# Patient Record
Sex: FEMALE | Race: BLACK OR AFRICAN AMERICAN | URBAN - METROPOLITAN AREA
[De-identification: names, ages, dates, MRNs, and addresses within clinical notes are randomized per-mention and may not be internally consistent; named-entity substitution may affect disease eponyms.]

---

## 2018-02-23 ENCOUNTER — INPATIENT (INPATIENT)
Facility: HOSPITAL | Age: 68
LOS: 4 days | Discharge: NURSING, REHAB OR LONG-TERM CARE | End: 2018-02-28
Attending: INTERNAL MEDICINE | Admitting: INTERNAL MEDICINE
Payer: MEDICARE

## 2018-02-23 VITALS — HEIGHT: 63 IN | WEIGHT: 100.09 LBS

## 2018-02-23 DIAGNOSIS — R69 ILLNESS, UNSPECIFIED: ICD-10-CM

## 2018-02-23 DIAGNOSIS — F03.90 UNSPECIFIED DEMENTIA WITHOUT BEHAVIORAL DISTURBANCE: ICD-10-CM

## 2018-02-23 LAB
ALBUMIN SERPL ELPH-MCNC: 3.5 G/DL — SIGNIFICANT CHANGE UP (ref 3.3–5)
ALP SERPL-CCNC: 111 U/L — SIGNIFICANT CHANGE UP (ref 40–120)
ALT FLD-CCNC: 25 U/L — SIGNIFICANT CHANGE UP (ref 12–78)
AMMONIA BLD-MCNC: <10 UMOL/L — LOW (ref 11–32)
AMPHET UR-MCNC: NEGATIVE — SIGNIFICANT CHANGE UP
ANION GAP SERPL CALC-SCNC: 4 MMOL/L — LOW (ref 5–17)
APAP SERPL-MCNC: <2 UG/ML — LOW (ref 10–30)
APPEARANCE UR: CLEAR — SIGNIFICANT CHANGE UP
AST SERPL-CCNC: 24 U/L — SIGNIFICANT CHANGE UP (ref 15–37)
BACTERIA # UR AUTO: (no result)
BARBITURATES UR SCN-MCNC: NEGATIVE — SIGNIFICANT CHANGE UP
BASOPHILS # BLD AUTO: 0.1 K/UL — SIGNIFICANT CHANGE UP (ref 0–0.2)
BASOPHILS NFR BLD AUTO: 1.4 % — SIGNIFICANT CHANGE UP (ref 0–2)
BENZODIAZ UR-MCNC: NEGATIVE — SIGNIFICANT CHANGE UP
BILIRUB SERPL-MCNC: 0.8 MG/DL — SIGNIFICANT CHANGE UP (ref 0.2–1.2)
BILIRUB UR-MCNC: NEGATIVE — SIGNIFICANT CHANGE UP
BUN SERPL-MCNC: 11 MG/DL — SIGNIFICANT CHANGE UP (ref 7–23)
CALCIUM SERPL-MCNC: 9.2 MG/DL — SIGNIFICANT CHANGE UP (ref 8.5–10.1)
CHLORIDE SERPL-SCNC: 100 MMOL/L — SIGNIFICANT CHANGE UP (ref 96–108)
CO2 SERPL-SCNC: 34 MMOL/L — HIGH (ref 22–31)
COCAINE METAB.OTHER UR-MCNC: NEGATIVE — SIGNIFICANT CHANGE UP
COLOR SPEC: YELLOW — SIGNIFICANT CHANGE UP
CREAT SERPL-MCNC: 0.76 MG/DL — SIGNIFICANT CHANGE UP (ref 0.5–1.3)
DIFF PNL FLD: NEGATIVE — SIGNIFICANT CHANGE UP
EOSINOPHIL # BLD AUTO: 0.2 K/UL — SIGNIFICANT CHANGE UP (ref 0–0.5)
EOSINOPHIL NFR BLD AUTO: 3 % — SIGNIFICANT CHANGE UP (ref 0–6)
EPI CELLS # UR: SIGNIFICANT CHANGE UP
ETHANOL SERPL-MCNC: <10 MG/DL — SIGNIFICANT CHANGE UP (ref 0–10)
GLUCOSE SERPL-MCNC: 85 MG/DL — SIGNIFICANT CHANGE UP (ref 70–99)
GLUCOSE UR QL: NEGATIVE MG/DL — SIGNIFICANT CHANGE UP
HCT VFR BLD CALC: 41.3 % — SIGNIFICANT CHANGE UP (ref 34.5–45)
HGB BLD-MCNC: 13.3 G/DL — SIGNIFICANT CHANGE UP (ref 11.5–15.5)
KETONES UR-MCNC: NEGATIVE — SIGNIFICANT CHANGE UP
LEUKOCYTE ESTERASE UR-ACNC: (no result)
LYMPHOCYTES # BLD AUTO: 2.1 K/UL — SIGNIFICANT CHANGE UP (ref 1–3.3)
LYMPHOCYTES # BLD AUTO: 40.6 % — SIGNIFICANT CHANGE UP (ref 13–44)
MCHC RBC-ENTMCNC: 29 PG — SIGNIFICANT CHANGE UP (ref 27–34)
MCHC RBC-ENTMCNC: 32.2 GM/DL — SIGNIFICANT CHANGE UP (ref 32–36)
MCV RBC AUTO: 89.8 FL — SIGNIFICANT CHANGE UP (ref 80–100)
METHADONE UR-MCNC: NEGATIVE — SIGNIFICANT CHANGE UP
MONOCYTES # BLD AUTO: 0.5 K/UL — SIGNIFICANT CHANGE UP (ref 0–0.9)
MONOCYTES NFR BLD AUTO: 10.6 % — SIGNIFICANT CHANGE UP (ref 2–14)
NEUTROPHILS # BLD AUTO: 2.3 K/UL — SIGNIFICANT CHANGE UP (ref 1.8–7.4)
NEUTROPHILS NFR BLD AUTO: 44.4 % — SIGNIFICANT CHANGE UP (ref 43–77)
NITRITE UR-MCNC: NEGATIVE — SIGNIFICANT CHANGE UP
OPIATES UR-MCNC: NEGATIVE — SIGNIFICANT CHANGE UP
PCP SPEC-MCNC: SIGNIFICANT CHANGE UP
PCP UR-MCNC: NEGATIVE — SIGNIFICANT CHANGE UP
PH UR: 8 — SIGNIFICANT CHANGE UP (ref 5–8)
PLATELET # BLD AUTO: 195 K/UL — SIGNIFICANT CHANGE UP (ref 150–400)
POTASSIUM SERPL-MCNC: 3.1 MMOL/L — LOW (ref 3.5–5.3)
POTASSIUM SERPL-SCNC: 3.1 MMOL/L — LOW (ref 3.5–5.3)
PROT SERPL-MCNC: 7.7 GM/DL — SIGNIFICANT CHANGE UP (ref 6–8.3)
PROT UR-MCNC: NEGATIVE MG/DL — SIGNIFICANT CHANGE UP
RBC # BLD: 4.59 M/UL — SIGNIFICANT CHANGE UP (ref 3.8–5.2)
RBC # FLD: 12.3 % — SIGNIFICANT CHANGE UP (ref 10.3–14.5)
RBC CASTS # UR COMP ASSIST: SIGNIFICANT CHANGE UP /HPF (ref 0–4)
SALICYLATES SERPL-MCNC: <1.7 MG/DL — LOW (ref 2.8–20)
SODIUM SERPL-SCNC: 138 MMOL/L — SIGNIFICANT CHANGE UP (ref 135–145)
SP GR SPEC: 1.01 — SIGNIFICANT CHANGE UP (ref 1.01–1.02)
THC UR QL: NEGATIVE — SIGNIFICANT CHANGE UP
TSH SERPL-MCNC: 1.03 UU/ML — SIGNIFICANT CHANGE UP (ref 0.36–3.74)
UROBILINOGEN FLD QL: NEGATIVE MG/DL — SIGNIFICANT CHANGE UP
WBC # BLD: 5.1 K/UL — SIGNIFICANT CHANGE UP (ref 3.8–10.5)
WBC # FLD AUTO: 5.1 K/UL — SIGNIFICANT CHANGE UP (ref 3.8–10.5)
WBC UR QL: SIGNIFICANT CHANGE UP

## 2018-02-23 PROCEDURE — 99285 EMERGENCY DEPT VISIT HI MDM: CPT

## 2018-02-23 PROCEDURE — 70450 CT HEAD/BRAIN W/O DYE: CPT | Mod: 26

## 2018-02-23 PROCEDURE — 93010 ELECTROCARDIOGRAM REPORT: CPT

## 2018-02-23 RX ORDER — POTASSIUM CHLORIDE 20 MEQ
40 PACKET (EA) ORAL ONCE
Qty: 0 | Refills: 0 | Status: COMPLETED | OUTPATIENT
Start: 2018-02-23 | End: 2018-02-23

## 2018-02-23 RX ADMIN — Medication 1 MILLIGRAM(S): at 21:58

## 2018-02-23 NOTE — ED ADULT NURSE NOTE - ED STAT RN HANDOFF DETAILS 2
Received report from RN Lizzette and reassessed patient. Agree with the previous RN assessment. Patient has a 1:1 CO in place and is to be admitted as a social admission. No distress or complaints, will continue to monitor/reassess.

## 2018-02-23 NOTE — ED BEHAVIORAL HEALTH ASSESSMENT NOTE - REFERRAL / APPOINTMENT DETAILS
Pt is cleared psychiatrically for discharge as presents with no acute psych needs.  Recommend neuro eval and placement

## 2018-02-23 NOTE — ED ADULT NURSE NOTE - OBJECTIVE STATEMENT
Patient presents with son at bedside. Son states he was called by her neighbors because patient was screaming, fingers were bleeding and patient was hoarding. Patient does not know the date or President. Son at bedside states decreased PO intake - patient requested a sandwich upon arrival to ER. Patient has flight of ideas, states she will dress like us and escape. States she will be a good patient if she can be discharged. Patient calm and cooperative. Son states patient has history of HTN and depression. States she does not take her medications

## 2018-02-23 NOTE — ED STATDOCS - PROGRESS NOTE DETAILS
Ambrose Brennan: 66 y/o female with pmhx of HTN, mitral valve prolapse and depression presents to ED c/o malnutrition. Son states pt is not eating properly, that she is only eating candy and nuts. Son states pt is a hoarder and hasn't seen a doctor over a year. Son states he has received calls from neighbors saying pt's fingers are bleeding and that she is screaming. Upon arrival, son found pt talking to herself. Pt states nothing is wrong with her, that she lives alone and has a right to brea things in her own house. Son states pt has been prescribed medication which she does not take. Pt has no other complaints and denies SOB, CP, fever/chills, n/v/d and HA. Pt is not able to recall the President, the month or the year--mentions she doesn't pay much attention to these things and can recall them if she needs to. Will be sent to Main ED for eval.

## 2018-02-23 NOTE — ED BEHAVIORAL HEALTH ASSESSMENT NOTE - SUMMARY
67 yodAAf, lives alone retired , no formal PPH, PMH Dementia x 5 yrs, bib son due to poor living conditions and hoarding, and inability to care for herself.  Son is trying to get affairs in order for placement but did not feel he could leave her alone any longer as she is confused and living on candy and nuts.  Psych eval ordered for confusion and reports of hallucination, which Pt denies however using much confabulation to compensate for impaired cognition.  Pt is not an imminent danger to self or others in context of psychiatric disorder however due to dementia and decline in functioning, require 24/7 supervision of placement for safety.  Refer to  for placement.  Pt is cleared psychiatrically for discharge and requires no psych intervention at this time.  Case reviewed with Dr Avila.

## 2018-02-23 NOTE — ED BEHAVIORAL HEALTH ASSESSMENT NOTE - HPI (INCLUDE ILLNESS QUALITY, SEVERITY, DURATION, TIMING, CONTEXT, MODIFYING FACTORS, ASSOCIATED SIGNS AND SYMPTOMS)
67 yoAAf, , mother of 2 adult children, retired , lives alone in house she owns, no prior PPH, psych admissions, SA, drug or alcohol abuse, PMH Dementia per Neurologist on eval 5 yrs ago, son has been trying to get a live in but unable due to conditions of house as Pt is a hoarder, and Pt is unwilling, stove was turned off in home due to safety concerns, or fire, bib son after finding Pt with no food other than candy to eat and poor living conditions and Pt inability to care for herself.  Pt interviewed in [facility], no 1 to 1 required, Pt pleasant and cooperative, confabulating history, did not know day, date, year, president, or prior president.  Speech disorganized , illogical report "spine removed" and per son was seen appearing as if she was having a phone conversation while at home, but no phone is in the house.  Pt denies depression, but claims she get depressed, "when they don't leave me alone",  but found by neighbors crying while sitting on porch is past month, denies perceptual disturbance, however is poor historian and limited by cognitive deficits. Oriented x 1.  Son is in process of finding a NH for placement but could not leave her home alone while he works full time and on placement for mother, so was advised to bring her to ED for assistance.  Son denies past psych history, treatment.

## 2018-02-23 NOTE — ED BEHAVIORAL HEALTH ASSESSMENT NOTE - RISK ASSESSMENT
min risk of intentional self harm no psychiatric history of h/o self harm.  Risk presented are in terms of elder care and supervision for basic needs.

## 2018-02-23 NOTE — ED PROVIDER NOTE - OBJECTIVE STATEMENT
66 y/o F with no PMHx per pt, [id number] years presents to ED for evaluation by son for increasing memory issues. Pt reports being increasingly dizzy for the past 2 days and felt very confused this morning. Son states pt has turned off the heat in her house, has stopped eating and drinking and has a poor overall diet. He states she is unsafe in her home. Neighbors have been reported to find pt outside crying on the curb. Son is pt power of . Pt denies any other Sx or concerns.

## 2018-02-23 NOTE — ED BEHAVIORAL HEALTH ASSESSMENT NOTE - DESCRIPTION
, retired , lives alone, 2 adult children Pt calm and cooperative in ED, appears confused, asking if it is night time, asking where her son was and to go home. Offered and accepted food and ate sandwich presents disoriented to place and time, confabulates, denies depression perceptual disturbances.  No evidence of psychosis or delirium or acute psychiatric symptoms. Dementia

## 2018-02-24 LAB
ALBUMIN SERPL ELPH-MCNC: 2.8 G/DL — LOW (ref 3.3–5)
ALP SERPL-CCNC: 97 U/L — SIGNIFICANT CHANGE UP (ref 40–120)
ALT FLD-CCNC: 20 U/L — SIGNIFICANT CHANGE UP (ref 12–78)
ANION GAP SERPL CALC-SCNC: 5 MMOL/L — SIGNIFICANT CHANGE UP (ref 5–17)
AST SERPL-CCNC: 22 U/L — SIGNIFICANT CHANGE UP (ref 15–37)
BASOPHILS # BLD AUTO: 0.1 K/UL — SIGNIFICANT CHANGE UP (ref 0–0.2)
BASOPHILS NFR BLD AUTO: 1.7 % — SIGNIFICANT CHANGE UP (ref 0–2)
BILIRUB SERPL-MCNC: 0.6 MG/DL — SIGNIFICANT CHANGE UP (ref 0.2–1.2)
BUN SERPL-MCNC: 9 MG/DL — SIGNIFICANT CHANGE UP (ref 7–23)
CALCIUM SERPL-MCNC: 8.6 MG/DL — SIGNIFICANT CHANGE UP (ref 8.5–10.1)
CHLORIDE SERPL-SCNC: 104 MMOL/L — SIGNIFICANT CHANGE UP (ref 96–108)
CO2 SERPL-SCNC: 32 MMOL/L — HIGH (ref 22–31)
CREAT SERPL-MCNC: 0.64 MG/DL — SIGNIFICANT CHANGE UP (ref 0.5–1.3)
EOSINOPHIL # BLD AUTO: 0.2 K/UL — SIGNIFICANT CHANGE UP (ref 0–0.5)
EOSINOPHIL NFR BLD AUTO: 5.3 % — SIGNIFICANT CHANGE UP (ref 0–6)
GLUCOSE SERPL-MCNC: 90 MG/DL — SIGNIFICANT CHANGE UP (ref 70–99)
HCT VFR BLD CALC: 37.8 % — SIGNIFICANT CHANGE UP (ref 34.5–45)
HGB BLD-MCNC: 12 G/DL — SIGNIFICANT CHANGE UP (ref 11.5–15.5)
LYMPHOCYTES # BLD AUTO: 2 K/UL — SIGNIFICANT CHANGE UP (ref 1–3.3)
LYMPHOCYTES # BLD AUTO: 46.8 % — HIGH (ref 13–44)
MAGNESIUM SERPL-MCNC: 1.9 MG/DL — SIGNIFICANT CHANGE UP (ref 1.6–2.6)
MCHC RBC-ENTMCNC: 28.8 PG — SIGNIFICANT CHANGE UP (ref 27–34)
MCHC RBC-ENTMCNC: 31.7 GM/DL — LOW (ref 32–36)
MCV RBC AUTO: 90.9 FL — SIGNIFICANT CHANGE UP (ref 80–100)
MONOCYTES # BLD AUTO: 0.4 K/UL — SIGNIFICANT CHANGE UP (ref 0–0.9)
MONOCYTES NFR BLD AUTO: 10.2 % — SIGNIFICANT CHANGE UP (ref 2–14)
NEUTROPHILS # BLD AUTO: 1.6 K/UL — LOW (ref 1.8–7.4)
NEUTROPHILS NFR BLD AUTO: 36 % — LOW (ref 43–77)
PLATELET # BLD AUTO: 174 K/UL — SIGNIFICANT CHANGE UP (ref 150–400)
POTASSIUM SERPL-MCNC: 3 MMOL/L — LOW (ref 3.5–5.3)
POTASSIUM SERPL-SCNC: 3 MMOL/L — LOW (ref 3.5–5.3)
PROT SERPL-MCNC: 5.9 GM/DL — LOW (ref 6–8.3)
RBC # BLD: 4.15 M/UL — SIGNIFICANT CHANGE UP (ref 3.8–5.2)
RBC # FLD: 12.5 % — SIGNIFICANT CHANGE UP (ref 10.3–14.5)
SODIUM SERPL-SCNC: 141 MMOL/L — SIGNIFICANT CHANGE UP (ref 135–145)
WBC # BLD: 4.4 K/UL — SIGNIFICANT CHANGE UP (ref 3.8–10.5)
WBC # FLD AUTO: 4.4 K/UL — SIGNIFICANT CHANGE UP (ref 3.8–10.5)

## 2018-02-24 PROCEDURE — 99222 1ST HOSP IP/OBS MODERATE 55: CPT

## 2018-02-24 RX ORDER — SENNA PLUS 8.6 MG/1
2 TABLET ORAL AT BEDTIME
Qty: 0 | Refills: 0 | Status: DISCONTINUED | OUTPATIENT
Start: 2018-02-24 | End: 2018-02-28

## 2018-02-24 RX ORDER — ACETAMINOPHEN 500 MG
650 TABLET ORAL EVERY 6 HOURS
Qty: 0 | Refills: 0 | Status: DISCONTINUED | OUTPATIENT
Start: 2018-02-24 | End: 2018-02-28

## 2018-02-24 RX ORDER — POTASSIUM CHLORIDE 20 MEQ
40 PACKET (EA) ORAL
Qty: 0 | Refills: 0 | Status: COMPLETED | OUTPATIENT
Start: 2018-02-24 | End: 2018-02-24

## 2018-02-24 RX ORDER — DOCUSATE SODIUM 100 MG
100 CAPSULE ORAL THREE TIMES A DAY
Qty: 0 | Refills: 0 | Status: DISCONTINUED | OUTPATIENT
Start: 2018-02-24 | End: 2018-02-28

## 2018-02-24 RX ORDER — ONDANSETRON 8 MG/1
4 TABLET, FILM COATED ORAL EVERY 6 HOURS
Qty: 0 | Refills: 0 | Status: DISCONTINUED | OUTPATIENT
Start: 2018-02-24 | End: 2018-02-28

## 2018-02-24 RX ORDER — QUETIAPINE FUMARATE 200 MG/1
12.5 TABLET, FILM COATED ORAL
Qty: 0 | Refills: 0 | Status: DISCONTINUED | OUTPATIENT
Start: 2018-02-24 | End: 2018-02-28

## 2018-02-24 RX ORDER — QUETIAPINE FUMARATE 200 MG/1
12.5 TABLET, FILM COATED ORAL EVERY 12 HOURS
Qty: 0 | Refills: 0 | Status: DISCONTINUED | OUTPATIENT
Start: 2018-02-24 | End: 2018-02-28

## 2018-02-24 RX ADMIN — QUETIAPINE FUMARATE 12.5 MILLIGRAM(S): 200 TABLET, FILM COATED ORAL at 21:27

## 2018-02-24 RX ADMIN — Medication 40 MILLIEQUIVALENT(S): at 16:36

## 2018-02-24 RX ADMIN — Medication 40 MILLIEQUIVALENT(S): at 12:45

## 2018-02-24 RX ADMIN — QUETIAPINE FUMARATE 12.5 MILLIGRAM(S): 200 TABLET, FILM COATED ORAL at 12:44

## 2018-02-24 NOTE — ED ADULT NURSE REASSESSMENT NOTE - NS ED NURSE REASSESS COMMENT FT1
0000: Nurse Rounding: Patient is sleeping at time of rounding, no apparent distress, complaints, or comfort measures needed at this time. 1:1 CO maintained. Patient to be admitted under a social admission. Awaiting hospitalist MD admission assessment. Will continue to monitor/reassess and ensure comfort/safety.  0130: Nurse Rounding: Patient is sleeping at time of rounding, no apparent distress, complaints, or comfort measures needed at this time. 1:1 CO maintained. Will continue to monitor/reassess and ensure comfort/safety. 0000: Nurse Rounding: Patient is sleeping at time of rounding, no apparent distress, complaints, or comfort measures needed at this time. 1:1 CO maintained. Patient to be admitted under a social admission. Awaiting hospitalist MD admission assessment. Will continue to monitor/reassess and ensure comfort/safety.  0130: Nurse Rounding: Patient is sleeping at time of rounding, no apparent distress, complaints, or comfort measures needed at this time. 1:1 CO maintained. Will continue to monitor/reassess and ensure comfort/safety.  0330: Nurse Rounding: Patient is sleeping at time of rounding, no apparent distress, complaints, or comfort measures needed at this time. Will continue to monitor/reassess and ensure comfort/safety.  0530: Nurse Rounding: Patient is sleeping at time of rounding, no apparent distress, complaints, or comfort measures needed at this time. Will continue to monitor/reassess and ensure comfort/safety.   0600: MD Costa at bedside preforming admission assessment.

## 2018-02-24 NOTE — H&P ADULT - HISTORY OF PRESENT ILLNESS
66 y/o F HTN, dementia, depression, and MVP who was referred to the ED by her son for evaluation of increased   confusion, decreased PO intake, poor living conditions, hoarding, and inability to care for herself. In the ED   Psychiatry was consulted for further recommendations. The patient's son states that the patient has not sought   medical care for over 1 year and that his mother has been living on "just candy and nuts." In the ED Psychiatry evaluated the patient. 68 y/o F HTN, dementia, depression, and MVP who was referred to the ED by her son for evaluation of increased confusion, decreased PO intake, poor living conditions, hoarding, and inability to care for herself. In the ED Psychiatry was consulted for further recommendations. The patient's son states that the patient has not sought medical care for over 1 year and that his mother has been living on "just candy and nuts." In the ED Psychiatry evaluated the patient.

## 2018-02-24 NOTE — H&P ADULT - ASSESSMENT
66 y/o F HTN, dementia, depression, and MVP who was referred to the ED by her son for evaluation of increased   confusion, decreased PO intake, poor living conditions, hoarding, and inability to care for herself. In the ED   Psychiatry was consulted for further recommendations. The patient's son states that the patient has not sought   medical care for over 1 year and that his mother has been living on "just candy and nuts." In the ED Psychiatry evaluated the patient. 66 y/o F HTN, dementia, depression, and MVP who was referred to the ED by her son for evaluation of increased confusion, decreased PO intake, poor living conditions, hoarding, and inability to care for herself. In the ED Psychiatry was consulted for further recommendations. The patient's son states that the patient has not sought medical care for over 1 year and that his mother has been living on "just candy and nuts." In the ED Psychiatry evaluated the patient. 66 y/o F HTN, dementia, depression, and MVP who was referred to the ED by her son for evaluation of increased confusion, decreased PO intake, poor living conditions, hoarding, and inability to care for herself. In the ED Psychiatry was consulted for further recommendations. The patient's son states that the patient has not sought medical care for over 1 year and that his mother has been living on "just candy and nuts." In the ED Psychiatry evaluated the patient.    1)Dementia without behavioral disturbance  ~admit to Medicine  ~f/u w/ Psychiatry  ~f/u w. Neurology in the am  ~f/u w/ SW for placement  ~cont. constant observation    2)Malnutrition  ~f/u w. Nutrition evaluation    3)Hypokalemia  ~supplemented in the ED  ~f/u am labs    4)Vte ppx  ~cont. SCDs for now

## 2018-02-24 NOTE — CONSULT NOTE ADULT - ASSESSMENT
68 y/o female with history of HTN, MVP, dementia / depression was referred to the ED by her son for  increased confusion, decreased PO intake, poor living conditions, hoarding, and inability to care for herself. Pt is able to provide only fragmented history, most of it is irrelevant, she seems obsessed with her car, she also thinks she is working full time, has no concept of month/year, but she knows where she lives.     # Dementia with mild behavioral disorder.    - Recommend B12, FOL, TSH  - MRI brain  - psyche consult for agitation and behavior.    D/W staff

## 2018-02-24 NOTE — CONSULT NOTE ADULT - SUBJECTIVE AND OBJECTIVE BOX
CC: 67 y old female who presents with a chief complaint of Confusion (24 Feb 2018 03:48)    HPI:  68 y/o female with history of HTN, MVP, dementia / depression was referred to the ED by her son for evaluation of increased confusion, decreased PO intake, poor living conditions, hoarding, and inability to care for herself. The patient's son states that the patient has not sought medical care for over 1 year and that his mother has been living on "just candy and nuts."    Pt is able to provide some history, it is fragmented, most of it is irrelevant, she seems obsessed with her car, she also thinks she is working full time, has no concept of month/year, but she knows where she lives.     Pt was seen in the ED Psychiatry was consulted for further recommendations. Pt recieved Seroquel.    PAST MEDICAL & SURGICAL HISTORY:  Depression  Mitral valve prolapse  HTN (hypertension)  No significant past surgical history    FAMILY HISTORY:  No pertinent family history in first degree relatives per history.    Social Hx: As per notes - non-smoker, no drug or alcohol use; lives alone, retired     MEDICATIONS  (STANDING):  potassium chloride    Tablet ER 40 milliEquivalent(s) Oral every 2 hours  QUEtiapine 12.5 milliGRAM(s) Oral every 12 hours     Allergies  No Known Allergies  Intolerances    ROS: Pertinent positives in HPI, all other ROS negative - as per pt.      Vital Signs Last 24 Hrs  T(C): 36.6 (23 Feb 2018 15:38), Max: 36.6 (23 Feb 2018 15:38)  T(F): 97.8 (23 Feb 2018 15:38), Max: 97.8 (23 Feb 2018 15:38)  HR: 62 (24 Feb 2018 05:00) (62 - 67)  BP: 119/75 (24 Feb 2018 05:00) (113/63 - 150/95)  BP(mean): --  RR: 16 (24 Feb 2018 05:00) (15 - 16)  SpO2: 100% (23 Feb 2018 23:00) (100% - 100%)    GE:  Constitutional: awake and alert.  HEENT: PERRLA, EOMI,   Neck: Supple.  Respiratory: Breath sounds are clear bilaterally  Cardiovascular: S1 and S2, regular rhythm  Extremities:  no edema  Vascular: Caritid Bruit - no  Musculoskeletal: no abnormal movements  Skin: No rashes    Neurological exam:  HF: A x O x 1. Interactive, Speech fluent, No Aphasia. Naming /repetition intact. Decreased concentration, attention and recall. Impulsive, gets agitated easily; had an outburst after neuro exam - got physical with the 1:1.   CN: ANGIE, EOMI, VFF, facial sensation normal, no NLFD, tongue midline, Palate moves equally, SCM equal bilaterally  Motor: No pronator drift, Strength 5/5 in all 4 ext, decreased muscle bulk, no tremor, rigidity or bradykinesia.    Sens: Intact to light touch / PP    Reflexes: BJ 2+, BR 2+, KJ 1+, AJ 0, downgoing toes b/l  Coord:  No FNFA, dysmetria, OPAL intact   Gait/Balance: Normal    Labs:   02-24    141  |  104  |  9   ----------------------------<  90  3.0<L>   |  32<H>  |  0.64    Ca    8.6      24 Feb 2018 06:21  Mg     1.9     02-24    TPro  5.9<L>  /  Alb  2.8<L>  /  TBili  0.6  /  DBili  x   /  AST  22  /  ALT  20  /  AlkPhos  97  02-24                        12.0   4.4   )-----------( 174      ( 24 Feb 2018 06:21 )             37.8       Radiology:  - CT Head:< from: CT Head No Cont (02.23.18 @ 16:04) >  No acute intracranial findings.    < end of copied text >

## 2018-02-25 LAB
ANION GAP SERPL CALC-SCNC: 6 MMOL/L — SIGNIFICANT CHANGE UP (ref 5–17)
BUN SERPL-MCNC: 12 MG/DL — SIGNIFICANT CHANGE UP (ref 7–23)
CALCIUM SERPL-MCNC: 8.6 MG/DL — SIGNIFICANT CHANGE UP (ref 8.5–10.1)
CHLORIDE SERPL-SCNC: 107 MMOL/L — SIGNIFICANT CHANGE UP (ref 96–108)
CO2 SERPL-SCNC: 29 MMOL/L — SIGNIFICANT CHANGE UP (ref 22–31)
CREAT SERPL-MCNC: 0.73 MG/DL — SIGNIFICANT CHANGE UP (ref 0.5–1.3)
FOLATE SERPL-MCNC: 6.1 NG/ML — SIGNIFICANT CHANGE UP (ref 4.8–24.2)
GLUCOSE SERPL-MCNC: 89 MG/DL — SIGNIFICANT CHANGE UP (ref 70–99)
MAGNESIUM SERPL-MCNC: 1.9 MG/DL — SIGNIFICANT CHANGE UP (ref 1.6–2.6)
PHOSPHATE SERPL-MCNC: 3.4 MG/DL — SIGNIFICANT CHANGE UP (ref 2.5–4.5)
POTASSIUM SERPL-MCNC: 3.8 MMOL/L — SIGNIFICANT CHANGE UP (ref 3.5–5.3)
POTASSIUM SERPL-SCNC: 3.8 MMOL/L — SIGNIFICANT CHANGE UP (ref 3.5–5.3)
SODIUM SERPL-SCNC: 142 MMOL/L — SIGNIFICANT CHANGE UP (ref 135–145)
T PALLIDUM AB TITR SER: NEGATIVE — SIGNIFICANT CHANGE UP
VIT B12 SERPL-MCNC: 432 PG/ML — SIGNIFICANT CHANGE UP (ref 232–1245)

## 2018-02-25 PROCEDURE — 70551 MRI BRAIN STEM W/O DYE: CPT | Mod: 26

## 2018-02-25 RX ADMIN — QUETIAPINE FUMARATE 12.5 MILLIGRAM(S): 200 TABLET, FILM COATED ORAL at 08:37

## 2018-02-25 RX ADMIN — QUETIAPINE FUMARATE 12.5 MILLIGRAM(S): 200 TABLET, FILM COATED ORAL at 21:19

## 2018-02-25 NOTE — PROGRESS NOTE ADULT - SUBJECTIVE AND OBJECTIVE BOX
c/c: confusion  HPI:  66 y/o F HTN, dementia, depression, and MVP who was referred to the ED by her son for evaluation of increased confusion, decreased PO intake, poor living conditions, hoarding, and inability to care for herself. In the ED Psychiatry was consulted for further recommendations. The patient's son states that the patient has not sought medical care for over 1 year and that his mother has been living on "just candy and nuts." In the ED Psychiatry evaluated the patient and felt no need for inpatient psychiatry admission. She was admitted to hospitalist service. Neurology was consulted. MRI brain has been ordered.     : pt seen and examined this am. Felt ok. NO complaints. Confused.       Review of system- All 10 systems reviewed and is as per HPI otherwise negative.     VITALS  T(C): 36.8 (18 @ 06:10), Max: 37 (18 @ 18:05)  HR: 66 (18 @ 06:10) (66 - 71)  BP: 110/61 (18 @ 06:10) (110/61 - 122/75)  RR: 15 (18 @ 06:10) (15 - 16)  SpO2: 100% (18 @ 06:10) (100% - 100%)  Wt(kg): --    PHYSICAL EXAM:    GENERAL: Comfortable, no acute distress  HEAD:  Atraumatic, Normocephalic  EYES: EOMI, PERRLA  HEENT: Moist mucous membranes  NECK: Supple, No JVD  NERVOUS SYSTEM:  confused (does not know date/year/president) Motor Strength 5/5 B/L upper and lower extremities  CHEST/LUNG: Clear to auscultation bilaterally  HEART: Regular rate and rhythm; No murmurs, rubs, or gallops  ABDOMEN: Soft, Nontender, Nondistended; Bowel sounds present  GENITOURINARY- Voiding, no palpable bladder  EXTREMITIES:  No clubbing, cyanosis, or edema  MUSCULOSKELTAL- No muscle tenderness, No joint tenderness  SKIN-no rash        LABS:                        12.0   4.4   )-----------( 174      ( 2018 06:21 )             37.8         142  |  107  |  12  ----------------------------<  89  3.8   |  29  |  0.73    Ca    8.6      2018 06:23  Phos  3.4     02-25  Mg     1.9     02-25    TPro  5.9<L>  /  Alb  2.8<L>  /  TBili  0.6  /  DBili  x   /  AST  22  /  ALT  20  /  AlkPhos  97  02-24      Urinalysis Basic - ( 2018 17:50 )    Color: Yellow / Appearance: Clear / S.010 / pH: x  Gluc: x / Ketone: Negative  / Bili: Negative / Urobili: Negative mg/dL   Blood: x / Protein: Negative mg/dL / Nitrite: Negative   Leuk Esterase: Small / RBC: 0-2 /HPF / WBC 0-2   Sq Epi: x / Non Sq Epi: Few / Bacteria: Occasional        MEDS  acetaminophen   Tablet 650 milliGRAM(s) Oral every 6 hours PRN  acetaminophen   Tablet. 650 milliGRAM(s) Oral every 6 hours PRN  docusate sodium 100 milliGRAM(s) Oral three times a day PRN  ondansetron Injectable 4 milliGRAM(s) IV Push every 6 hours PRN  QUEtiapine 12.5 milliGRAM(s) Oral every 12 hours  QUEtiapine 12.5 milliGRAM(s) Oral two times a day PRN  senna 2 Tablet(s) Oral at bedtime PRN      ASSESSMENT AND PLAN:  67f, PMH AS ABOVE A/W:    1. Dementia with periods of agitation:  -psych eval noted, no need for inpatient psych admission  -continue seroquel as ordered  -neuro eval appreciated, f/u b12/mri brain  -rpr neg    2. Malnutrition:  -nutrition consulted    3. Hypokalemia:  -repleted    4. DVT px

## 2018-02-26 RX ADMIN — QUETIAPINE FUMARATE 12.5 MILLIGRAM(S): 200 TABLET, FILM COATED ORAL at 05:03

## 2018-02-26 RX ADMIN — QUETIAPINE FUMARATE 12.5 MILLIGRAM(S): 200 TABLET, FILM COATED ORAL at 09:17

## 2018-02-26 RX ADMIN — QUETIAPINE FUMARATE 12.5 MILLIGRAM(S): 200 TABLET, FILM COATED ORAL at 22:35

## 2018-02-27 RX ADMIN — QUETIAPINE FUMARATE 12.5 MILLIGRAM(S): 200 TABLET, FILM COATED ORAL at 11:04

## 2018-02-27 NOTE — PHYSICAL THERAPY INITIAL EVALUATION ADULT - PREDICTED DURATION OF THERAPY (DAYS/WKS), PT EVAL
pt w/o skilled need for PT in this acute setting, appears to be at max level of function, recommend supervison due to cog. impairment

## 2018-02-27 NOTE — PHYSICAL THERAPY INITIAL EVALUATION ADULT - PERTINENT HX OF CURRENT PROBLEM, REHAB EVAL
referred to the ED by her son for evaluation of increased confusion, decreased PO intake, poor living conditions, hoarding, and inability to care for herself. MRI (-) acute findings.

## 2018-02-27 NOTE — PHYSICAL THERAPY INITIAL EVALUATION ADULT - GENERAL OBSERVATIONS, REHAB EVAL
pt rec'd sitting at EOB on 2N, 1:1 present. pt offers no c/o. seems paranoid, questioning PT's motives for being there.

## 2018-02-28 ENCOUNTER — TRANSCRIPTION ENCOUNTER (OUTPATIENT)
Age: 68
End: 2018-02-28

## 2018-02-28 VITALS
RESPIRATION RATE: 16 BRPM | OXYGEN SATURATION: 100 % | SYSTOLIC BLOOD PRESSURE: 136 MMHG | TEMPERATURE: 98 F | DIASTOLIC BLOOD PRESSURE: 75 MMHG | HEART RATE: 60 BPM

## 2018-02-28 RX ORDER — INFLUENZA VIRUS VACCINE 15; 15; 15; 15 UG/.5ML; UG/.5ML; UG/.5ML; UG/.5ML
0.5 SUSPENSION INTRAMUSCULAR ONCE
Qty: 0 | Refills: 0 | Status: COMPLETED | OUTPATIENT
Start: 2018-02-28 | End: 2018-02-28

## 2018-02-28 RX ORDER — QUETIAPINE FUMARATE 200 MG/1
12.5 TABLET, FILM COATED ORAL
Qty: 0 | Refills: 0 | COMMUNITY
Start: 2018-02-28

## 2018-02-28 RX ORDER — SENNA PLUS 8.6 MG/1
2 TABLET ORAL
Qty: 0 | Refills: 0 | COMMUNITY
Start: 2018-02-28

## 2018-02-28 RX ORDER — DOCUSATE SODIUM 100 MG
1 CAPSULE ORAL
Qty: 0 | Refills: 0 | COMMUNITY
Start: 2018-02-28

## 2018-02-28 RX ADMIN — QUETIAPINE FUMARATE 12.5 MILLIGRAM(S): 200 TABLET, FILM COATED ORAL at 12:19

## 2018-02-28 RX ADMIN — INFLUENZA VIRUS VACCINE 0.5 MILLILITER(S): 15; 15; 15; 15 SUSPENSION INTRAMUSCULAR at 12:19

## 2018-02-28 NOTE — CHART NOTE - NSCHARTNOTEFT_GEN_A_CORE
Upon Nutritional Assessment by the Registered Dietitian your patient was determined to meet criteria / has evidence of the following diagnosis/diagnoses:          [ ]  Mild Protein Calorie Malnutrition        [ ]  Moderate Protein Calorie Malnutrition        [x] Severe Protein Calorie Malnutrition        [ ] Unspecified Protein Calorie Malnutrition        [x] Underweight / BMI <19        [ ] Morbid Obesity / BMI > 40      Findings:  decreased PO intake; per MD note, pt's son reported pt only eating candy and nuts; based on pt's son report, likely pt was meeting <75% of estimated nutr needs. No answer on pt's son number when attempted to reach for additional information.  Upon visit, pt appears thin with severe temporal, clavicle, and scapula muscle wasting.  No wt hx in EMR.  BMI of 17.7, underwt.  Based on the above, pt meets severe malnutrition in context of chronic illness (dementia).    Findings as based on:  •  Comprehensive nutrition assessment and consultation  •  Calorie counts (nutrient intake analysis)  •  Food acceptance and intake status from observations by staff  •  Follow up  •  Patient education  •  Intervention secondary to interdisciplinary rounds  •   concerns      Treatment:    The following diet has been recommended:  add ensure enlive once daily, add lactose free to diet Rx, monitor PO intake/wt closely    PROVIDER Section:     By signing this assessment you are acknowledging and agree with the diagnosis/diagnoses assigned by the Registered Dietitian    Comments:

## 2018-02-28 NOTE — DIETITIAN INITIAL EVALUATION ADULT. - OTHER INFO
66yo female from community with PMH of dementia, HTN, depression p/w confusion and decreased PO intake; per MD note, pt's son reported pt only eating candy and nuts; based on pt's son report, likely pt was meeting <75% of estimated nutr needs. No answer on pt's son number when attempted to reach for additional information.  Upon visit, pt appears thin with severe temporal, clavicle, and scapula muscle wasting.  No wt hx in EMR.  BMI of 17.7, underwt.  Based on the above, pt meets severe malnutrition in context of chronic illness (dementia).  Since admission, pt with good PO intake; consuming ~50-75% of tray.  Pt reports lactose intolerance; unclear if accurate.  rec'd add lactose to diet Rx.  Pt pending d/c; rec'd add ensure enlive once daily to optimize PO intake and aid in wt gain towards IBW range.  monitor PO intake/wt closely.  received consult for assessment from [facility].

## 2018-02-28 NOTE — DISCHARGE NOTE ADULT - CARE PLAN
Principal Discharge DX:	Dementia  Goal:	long-term placement  Assessment and plan of treatment:	establish PMD at SNF

## 2018-02-28 NOTE — DISCHARGE NOTE ADULT - MEDICATION SUMMARY - MEDICATIONS TO TAKE
I will START or STAY ON the medications listed below when I get home from the hospital:    QUEtiapine  -- 12.5 milligram(s) by mouth 2 times a day  -- Indication: For Dementia    docusate sodium 100 mg oral capsule  -- 1 cap(s) by mouth 3 times a day, As needed, Constipation  -- Indication: For bowel regimen    senna oral tablet  -- 2 tab(s) by mouth once a day (at bedtime), As needed, Constipation  -- Indication: For bowel regimen

## 2018-02-28 NOTE — PROGRESS NOTE ADULT - SUBJECTIVE AND OBJECTIVE BOX
c/c: confusion  HPI:  66 y/o F HTN, dementia, depression, and MVP who was referred to the ED by her son for evaluation of increased confusion, decreased PO intake, poor living conditions, hoarding, and inability to care for herself. In the ED Psychiatry was consulted for further recommendations. The patient's son states that the patient has not sought medical care for over 1 year and that his mother has been living on "just candy and nuts." In the ED Psychiatry evaluated the patient and felt no need for inpatient psychiatry admission. She was admitted to hospitalist service. Neurology was consulted. MRI brain has been ordered.     2/25: pt seen and examined this am. Felt ok. NO complaints. Confused.   2/26 confused  2/27 no acute events  2/28 no acute events    Review of system- All 10 systems reviewed and is as per HPI otherwise negative.     Vital Signs Last 24 Hrs  T(C): 36.6 (28 Feb 2018 05:54), Max: 37.1 (27 Feb 2018 17:16)  T(F): 97.8 (28 Feb 2018 05:54), Max: 98.7 (27 Feb 2018 17:16)  HR: 63 (28 Feb 2018 05:54) (63 - 67)  BP: 107/55 (28 Feb 2018 05:54) (107/55 - 129/68)  BP(mean): --  RR: 16 (28 Feb 2018 05:54) (16 - 16)  SpO2: 100% (28 Feb 2018 05:54) (98% - 100%)    PHYSICAL EXAM:  GENERAL: Comfortable, no acute distress  HEAD:  Atraumatic, Normocephalic  EYES: EOMI, PERRLA  HEENT: Moist mucous membranes  NECK: Supple, No JVD  NERVOUS SYSTEM:  confused, ambulates independently  CHEST/LUNG: Clear to auscultation bilaterally  HEART: Regular rate and rhythm; No murmurs, rubs, or gallops  ABDOMEN: Soft, Nontender, Nondistended; Bowel sounds present  GENITOURINARY- Voiding, no palpable bladder  EXTREMITIES:  No clubbing, cyanosis, or edema  MUSCULOSKELTAL- No muscle tenderness, No joint tenderness  SKIN-no rash    LABS:  25 Feb 2018 06:23    142    |  107    |  12     ----------------------------<  89     3.8     |  29     |  0.73     Ca    8.6        25 Feb 2018 06:23  Phos  3.4       25 Feb 2018 06:23  Mg     1.9       25 Feb 2018 06:23    MEDS  acetaminophen   Tablet 650 milliGRAM(s) Oral every 6 hours PRN  acetaminophen   Tablet. 650 milliGRAM(s) Oral every 6 hours PRN  docusate sodium 100 milliGRAM(s) Oral three times a day PRN  ondansetron Injectable 4 milliGRAM(s) IV Push every 6 hours PRN  QUEtiapine 12.5 milliGRAM(s) Oral every 12 hours  QUEtiapine 12.5 milliGRAM(s) Oral two times a day PRN  senna 2 Tablet(s) Oral at bedtime PRN    ASSESSMENT AND PLAN:  #Dementia with periods of agitation:  -psych eval noted, no need for inpatient psych admission  -continue seroquel as ordered  -neuro eval appreciated, f/u b12/mri brain  -rpr neg  -Placement    #Malnutrition:  -nutrition consulted    #Hypokalemia:  -repleted    #Dispo- long-term placement.

## 2018-02-28 NOTE — DIETITIAN INITIAL EVALUATION ADULT. - ENERGY NEEDS
Ht. 63 "        Wt. 45.4 kg         17.7 BMI                  IBW 52.3 kg               Pt is at 87%  IBW

## 2018-02-28 NOTE — DISCHARGE NOTE ADULT - PATIENT PORTAL LINK FT
You can access the [facility] Patient Portal, offered by [facility], by registering with the following website: [email or web address]

## 2018-02-28 NOTE — DISCHARGE NOTE ADULT - HOSPITAL COURSE
CC- dementia  HPI:  68 y/o F HTN, dementia, depression, and MVP who was referred to the ED by her son for evaluation of increased confusion, decreased PO intake, poor living conditions, hoarding, and inability to care for herself. In the ED Psychiatry was consulted for further recommendations. The patient's son states that the patient has not sought medical care for over 1 year and that his mother has been living on "just candy and nuts." In the ED Psychiatry evaluated the patient. (2018 03:48)  Hospital stay- uncomplicated. For LTC placement  Review of system- All 10 systems reviewed and is as per HPI otherwise negative.   T(C): 36.6 (18 @ 05:54), Max: 37.1 (18 @ 17:16)  HR: 63 (18 @ 05:54) (63 - 67)  BP: 107/55 (18 @ 05:54) (107/55 - 129/68)  RR: 16 (18 @ 05:54) (16 - 16)  SpO2: 100% (18 @ 05:54) (98% - 100%)  Wt(kg): --    PHYSICAL EXAM:  GENERAL: NAD, well-groomed, well-developed  HEAD:  Atraumatic, Normocephalic  EYES: EOMI, PERRLA, conjunctiva and sclera clear  HEENT: Moist mucous membranes  NECK: Supple, No JVD  NERVOUS SYSTEM:  Awake, confused  CHEST/LUNG: Clear to auscultation bilaterally; No rales, rhonchi, wheezing, or rubs  HEART: Regular rate and rhythm; No murmurs, rubs, or gallops  ABDOMEN: Soft, Nontender, Nondistended; Bowel sounds present  GENITOURINARY- Voiding, no palpable bladder  EXTREMITIES:  2+ Peripheral Pulses, No clubbing, cyanosis, or edema  MUSCULOSKELTAL- No muscle tenderness, Muscle tone normal, No joint tenderness, no Joint swelling, Joint range of motion-normal  SKIN-no rash, no lesion     Daily Weight in k.4 (2018 11:06)    Assessment/Plan  #Dementia- for LTC placement  #Dispo- SNF today

## 2018-02-28 NOTE — CHART NOTE - NSCHARTNOTESELECT_GEN_ALL_CORE
Patient notified of results and recommendations. Patient expressed understanding and agrees to plan of care.   malnutrition note

## 2018-03-02 DIAGNOSIS — F32.9 MAJOR DEPRESSIVE DISORDER, SINGLE EPISODE, UNSPECIFIED: ICD-10-CM

## 2018-03-02 DIAGNOSIS — Z72.89 OTHER PROBLEMS RELATED TO LIFESTYLE: ICD-10-CM

## 2018-03-02 DIAGNOSIS — R45.1 RESTLESSNESS AND AGITATION: ICD-10-CM

## 2018-03-02 DIAGNOSIS — Z23 ENCOUNTER FOR IMMUNIZATION: ICD-10-CM

## 2018-03-02 DIAGNOSIS — E87.6 HYPOKALEMIA: ICD-10-CM

## 2018-03-02 DIAGNOSIS — F03.91 UNSPECIFIED DEMENTIA WITH BEHAVIORAL DISTURBANCE: ICD-10-CM

## 2018-03-02 DIAGNOSIS — E43 UNSPECIFIED SEVERE PROTEIN-CALORIE MALNUTRITION: ICD-10-CM

## 2018-03-02 DIAGNOSIS — Z60.2 PROBLEMS RELATED TO LIVING ALONE: ICD-10-CM

## 2018-03-02 SDOH — SOCIAL STABILITY - SOCIAL INSECURITY: PROBLEMS RELATED TO LIVING ALONE: Z60.2

## 2019-01-28 NOTE — H&P ADULT - OPHTHALMOLOGIC
Called and left detailed message that patient can schedule an established care visit with Dr. Bettencourt when she returns from [place].     negative

## 2020-02-03 NOTE — ED BEHAVIORAL HEALTH ASSESSMENT NOTE - ESTIMATED INTELLIGENCE
Chest x-ray:  Two views.

 

History: Chest pain .

 

Comparison study: December 11, 2019 .

 

Findings:  The lungs are well inflated and free of infiltrate.  The pleural

angles are sharp.  The heart size is normal.  Pulmonary vasculature is not

increased.  No significant bony abnormality is seen.

 

Impression:

 

Negative chest x-ray.

 

 

Electronically Signed by

Len Mills MD 02/03/2020 12:09 P Other

## 2022-04-29 NOTE — H&P ADULT - NSHPPHYSICALEXAM_GEN_ALL_CORE
Vital Signs Last 24 Hrs  T(C): 36.6 (23 Feb 2018 15:38), Max: 36.6 (23 Feb 2018 15:38)  T(F): 97.8 (23 Feb 2018 15:38), Max: 97.8 (23 Feb 2018 15:38)  HR: 67 (23 Feb 2018 15:38) (67 - 67)  BP: 150/95 (23 Feb 2018 15:38) (150/95 - 150/95)  RR: 15 (23 Feb 2018 15:38) (15 - 15)  SpO2: 100% (23 Feb 2018 15:38) (100% - 100%)
There are no Wet Read(s) to document.

## 2023-11-01 NOTE — PATIENT PROFILE ADULT. - PMH
Controlled with current regime Controlled with current regime Depression    HTN (hypertension)    Mitral valve prolapse

## 2024-06-14 NOTE — PROGRESS NOTE ADULT - SUBJECTIVE AND OBJECTIVE BOX
c/c: confusion  HPI:  66 y/o F HTN, dementia, depression, and MVP who was referred to the ED by her son for evaluation of increased confusion, decreased PO intake, poor living conditions, hoarding, and inability to care for herself. In the ED Psychiatry was consulted for further recommendations. The patient's son states that the patient has not sought medical care for over 1 year and that his mother has been living on "just candy and nuts." In the ED Psychiatry evaluated the patient and felt no need for inpatient psychiatry admission. She was admitted to hospitalist service. Neurology was consulted. MRI brain has been ordered.     2/25: pt seen and examined this am. Felt ok. NO complaints. Confused.   2/26 confused    Review of system- All 10 systems reviewed and is as per HPI otherwise negative.     Vital Signs Last 24 Hrs  T(C): 36.8 (25 Feb 2018 20:02), Max: 37 (25 Feb 2018 16:44)  T(F): 98.3 (25 Feb 2018 20:02), Max: 98.6 (25 Feb 2018 16:44)  HR: 67 (25 Feb 2018 20:02) (67 - 69)  BP: 120/76 (25 Feb 2018 20:02) (107/70 - 120/76)  BP(mean): --  RR: 16 (25 Feb 2018 16:44) (16 - 16)  SpO2: 100% (25 Feb 2018 20:02) (100% - 100%)    PHYSICAL EXAM:  GENERAL: Comfortable, no acute distress  HEAD:  Atraumatic, Normocephalic  EYES: EOMI, PERRLA  HEENT: Moist mucous membranes  NECK: Supple, No JVD  NERVOUS SYSTEM:  confused (does not know date/year/president) Motor Strength 5/5 B/L upper and lower extremities  CHEST/LUNG: Clear to auscultation bilaterally  HEART: Regular rate and rhythm; No murmurs, rubs, or gallops  ABDOMEN: Soft, Nontender, Nondistended; Bowel sounds present  GENITOURINARY- Voiding, no palpable bladder  EXTREMITIES:  No clubbing, cyanosis, or edema  MUSCULOSKELTAL- No muscle tenderness, No joint tenderness  SKIN-no rash    LABS:  25 Feb 2018 06:23    142    |  107    |  12     ----------------------------<  89     3.8     |  29     |  0.73     Ca    8.6        25 Feb 2018 06:23  Phos  3.4       25 Feb 2018 06:23  Mg     1.9       25 Feb 2018 06:23    MEDS  acetaminophen   Tablet 650 milliGRAM(s) Oral every 6 hours PRN  acetaminophen   Tablet. 650 milliGRAM(s) Oral every 6 hours PRN  docusate sodium 100 milliGRAM(s) Oral three times a day PRN  ondansetron Injectable 4 milliGRAM(s) IV Push every 6 hours PRN  QUEtiapine 12.5 milliGRAM(s) Oral every 12 hours  QUEtiapine 12.5 milliGRAM(s) Oral two times a day PRN  senna 2 Tablet(s) Oral at bedtime PRN    ASSESSMENT AND PLAN:  #Dementia with periods of agitation:  -psych eval noted, no need for inpatient psych admission  -continue seroquel as ordered  -neuro eval appreciated, f/u b12/mri brain  -rpr neg  -Placement    #Malnutrition:  -nutrition consulted    #Hypokalemia:  -repleted    #Dispo- long-term placement. Keep on 1:1 for safety and risk of elopement [0] : 2) Feeling down, depressed, or hopeless: Not at all (0) [[id number]] : 0

## 2025-05-21 NOTE — PATIENT PROFILE ADULT. - HAS THE PATIENT HAD A SIGNIFICANT CHANGE IN FUNCTIONAL STATUS DUE TO CVA, HEAD TRAUMA, ORTHOPEDIC TRAUMA/SURGERY, OR FALL, WITH THE WEEK PRIOR TO ADMISSION
Will notify you if any of the test results do come back positive  Take the steroid pack as prescribed  You can do saline nasal spray to help clear the sinuses.  Also Flonase nasal spray 2 sprays each nostril once a day  Can take your cold and sinus medicine if beneficial  Rest and push fluids  
no